# Patient Record
Sex: FEMALE | Race: WHITE | NOT HISPANIC OR LATINO | Employment: FULL TIME | URBAN - METROPOLITAN AREA
[De-identification: names, ages, dates, MRNs, and addresses within clinical notes are randomized per-mention and may not be internally consistent; named-entity substitution may affect disease eponyms.]

---

## 2021-05-18 ENCOUNTER — TELEPHONE (OUTPATIENT)
Dept: SCHEDULING | Facility: IMAGING CENTER | Age: 61
End: 2021-05-18

## 2021-05-19 ENCOUNTER — OFFICE VISIT (OUTPATIENT)
Dept: MEDICAL GROUP | Facility: PHYSICIAN GROUP | Age: 61
End: 2021-05-19
Payer: COMMERCIAL

## 2021-05-19 VITALS
BODY MASS INDEX: 36.64 KG/M2 | OXYGEN SATURATION: 94 % | TEMPERATURE: 96.9 F | HEART RATE: 68 BPM | RESPIRATION RATE: 12 BRPM | DIASTOLIC BLOOD PRESSURE: 80 MMHG | SYSTOLIC BLOOD PRESSURE: 130 MMHG | WEIGHT: 228 LBS | HEIGHT: 66 IN

## 2021-05-19 DIAGNOSIS — R53.83 OTHER FATIGUE: ICD-10-CM

## 2021-05-19 DIAGNOSIS — R01.2 ABNORMAL HEART SOUNDS: ICD-10-CM

## 2021-05-19 DIAGNOSIS — E78.5 HYPERLIPIDEMIA, UNSPECIFIED HYPERLIPIDEMIA TYPE: ICD-10-CM

## 2021-05-19 DIAGNOSIS — K21.9 GASTROESOPHAGEAL REFLUX DISEASE WITHOUT ESOPHAGITIS: ICD-10-CM

## 2021-05-19 DIAGNOSIS — Z98.890 STATUS POST DILATION OF ESOPHAGEAL NARROWING: ICD-10-CM

## 2021-05-19 DIAGNOSIS — R63.5 WEIGHT GAIN: ICD-10-CM

## 2021-05-19 DIAGNOSIS — Z87.19 STATUS POST DILATION OF ESOPHAGEAL NARROWING: ICD-10-CM

## 2021-05-19 DIAGNOSIS — R03.0 ELEVATED BLOOD PRESSURE READING: ICD-10-CM

## 2021-05-19 PROCEDURE — 99203 OFFICE O/P NEW LOW 30 MIN: CPT | Performed by: NURSE PRACTITIONER

## 2021-05-19 RX ORDER — M-VIT,TX,IRON,MINS/CALC/FOLIC 27MG-0.4MG
1 TABLET ORAL DAILY
COMMUNITY

## 2021-05-19 ASSESSMENT — ENCOUNTER SYMPTOMS
WHEEZING: 0
SHORTNESS OF BREATH: 0
DIZZINESS: 0
WEIGHT LOSS: 0
COUGH: 0
DEPRESSION: 0
ABDOMINAL PAIN: 0
BLOOD IN STOOL: 0
BLURRED VISION: 0
HEADACHES: 0
INSOMNIA: 0
MUSCULOSKELETAL NEGATIVE: 1
HEARTBURN: 1
CHILLS: 0
NERVOUS/ANXIOUS: 0
PALPITATIONS: 0
WEAKNESS: 0
FEVER: 0

## 2021-05-19 NOTE — ASSESSMENT & PLAN NOTE
History of GERD.  Patient was previously on omeprazole for 2 weeks after she had a chest like pressure event and a cardiac work-up was negative in [place].  At that time she had also had a EGD completed with dilation in 2017.

## 2021-05-19 NOTE — ASSESSMENT & PLAN NOTE
Acute uncomplicated condition.  Patient currently with a blood pressure of 130/80.  States that occasionally at home she is noticed that her blood pressure has been increasing.  Her blood pressure cuff is packed away as they just moved here from [place].  Denies any headaches, change in vision, shortness of breath, chest pressure.

## 2021-05-19 NOTE — ASSESSMENT & PLAN NOTE
Chronic and stable condition.  States an increase in weight gain over the last few months.  She has been stressed with moving  Has not had a lot of time to get out and exercise at this time but does hike and walk around the neighborhood with her

## 2021-05-19 NOTE — ASSESSMENT & PLAN NOTE
Chronic and stable condition.  History of esophageal dilation back in 2017 when she was having difficulty with swallowing.  At that time she was also taking omeprazole for GERD.  Currently states that she still has occasional difficulty swallowing about 2-3 times a week.

## 2021-05-19 NOTE — PROGRESS NOTES
Chief Complaint   Patient presents with   • Establish Care      Subjective:     Katherine Davis is a 61 y.o. female who recently moved from [place] to [place] to be closer to family.  Presenting to establish care and management of:      Elevated blood pressure reading  Acute uncomplicated condition.  Patient currently with a blood pressure of 130/80.  States that occasionally at home she is noticed that her blood pressure has been increasing.  Her blood pressure cuff is packed away as they just moved here from [place].  Denies any headaches, change in vision, shortness of breath, chest pressure.    Weight gain  Chronic and stable condition.  States an increase in weight gain over the last few months.  She has been stressed with moving  Has not had a lot of time to get out and exercise at this time but does hike and walk around the neighborhood with her     Hyperlipidemia  Familial history.  We will obtain new labs.    Other fatigue  Acute uncomplicated condition.  Patient is uncertain if this could be due to her recently moving and trying to move back into their new house.    Gastroesophageal reflux disease without esophagitis  History of GERD.  Patient was previously on omeprazole for 2 weeks after she had a chest like pressure event and a cardiac work-up was negative in [place].  At that time she had also had a EGD completed with dilation in 2017.    Status post dilation of esophageal narrowing  Chronic and stable condition.  History of esophageal dilation back in 2017 when she was having difficulty with swallowing.  At that time she was also taking omeprazole for GERD.  Currently states that she still has occasional difficulty swallowing about 2-3 times a week.    Abnormal heart sounds  possible mitral regurigation  Patient denies any history  Will obtain records [facility]  Denies SOB, syncopal episodes or LE edema       Review of Systems   Constitutional: Negative for chills, fever and  "weight loss.   HENT: Negative.  Negative for congestion and hearing loss.    Eyes: Negative for blurred vision.   Respiratory: Negative for cough, shortness of breath and wheezing.    Cardiovascular: Negative for chest pain, palpitations and leg swelling.   Gastrointestinal: Positive for heartburn. Negative for abdominal pain and blood in stool.   Genitourinary: Negative for dysuria and hematuria.   Musculoskeletal: Negative.    Skin: Negative.    Neurological: Negative for dizziness, weakness and headaches.   Psychiatric/Behavioral: Negative for depression and suicidal ideas. The patient is not nervous/anxious and does not have insomnia.             Current Outpatient Medications:   •  therapeutic multivitamin-minerals (THERAGRAN-M) Tab, Take 1 tablet by mouth every day., Disp: , Rfl:   •  Turmeric (QC TUMERIC COMPLEX PO), Take  by mouth., Disp: , Rfl:     No past medical history on file.    No past surgical history on file.    Family History   Problem Relation Age of Onset   • Hyperlipidemia Father    • Lung Disease Brother         asthma       Patient has no known allergies.    Allergies, past medical history, past surgical history, family history, social history reviewed and updated    Objective:     Vitals: /80   Pulse 68   Temp 36.1 °C (96.9 °F) (Temporal)   Resp 12   Ht 1.676 m (5' 6\")   Wt 103 kg (228 lb)   SpO2 94%   BMI 36.80 kg/m²   General: Alert, pleasant, NAD  EYES:   PERRL, EOMI, no icterus or pallor.  Conjunctivae and lids normal.   HENT:  Normocephalic.  External ears normal. Tympanic membranes pearly, opaque.  No nasal drainage present.  Oropharynx non-erythematous, mucous membranes moist.  Neck supple.   No thyromegaly or masses palpated. No cervical or supraclavicular lymphadenopathy.  Heart: Regular rate and rhythm.  Murmur appreciated.  Respiratory: Normal respiratory effort.  Clear to auscultation bilaterally.  Abdomen: Non-distended, soft, non-tender, no guarding/rebound. Bowel " sounds present.  No hepatosplenomegaly.  No hernias.  Skin: Warm, dry, no rashes.  Musculoskeletal: Gait is normal.  Moves all extremities well.    Extremities: No clubbing, cyanosis or edema noted.   Neurological: No tremors, sensation grossly intact, tone/strength normal, gait is normal, CN2-12 intact.  Psych:  Affect/mood is normal, judgement is good, memory is intact, grooming is appropriate.    Assessment/Plan:     1. Elevated blood pressure reading  - Comp Metabolic Panel; Future  Patient will bring in BP journal for further evaluation     2. Weight gain  - TSH WITH REFLEX TO FT4; Future  Life style management, diet and exercise.     3. Hyperlipidemia, unspecified hyperlipidemia type  - Lipid Profile; Future    4. Other fatigue  - CBC WITH DIFFERENTIAL; Future  - Comp Metabolic Panel; Future  - TSH WITH REFLEX TO FT4; Future  We will get labs to r/o any hormonal issues    5. Gastroesophageal reflux disease without esophagitis  Was previously on Omeprazole. Patient is willing to try OTC for the next two weeks to see if there are any changes and will follow up on efficacy  In 2 weeks    6. Status post dilation of esophageal narrowing  Will obtain results and efficacy of medication.   Possible referral for GI at next visit if patient is still experiencing issues.     Discussed with patient possible alternative diagnoses, patient is to take all medications as prescribed.     If symptoms persist FU w/PCP, if symptoms worsen go to emergency room.     If experiencing any side effects from prescribed medications reports to the office immediately or go to emergency room.    Reviewed indication, dosage, usage and potential adverse effects of prescribed medications.     Reviewed risks and benefits of treatment plan. Patient verbalizes understanding of all instruction and verbally agrees to plan.    Discussed plan with the patient, and she agrees to the above.      I personally reviewed prior external notes and test  results pertinent to today's visit.        Return in about 2 weeks (around 6/2/2021).    My total time spent caring for the patient on the day of the encounter was 30 minutes.   This does not include time spent on separately billable procedures/tests.     Please note that this dictation was created using voice recognition software. I have made every reasonable attempt to correct obvious errors, but I expect that there may be errors of grammar and possibly content that I did not discover before finalizing the note.

## 2021-05-19 NOTE — ASSESSMENT & PLAN NOTE
possible mitral regurigation  Patient denies any history  Will obtain records from [facility]  Denies SOB, syncopal episodes or LE edema

## 2021-05-19 NOTE — ASSESSMENT & PLAN NOTE
Acute uncomplicated condition.  Patient is uncertain if this could be due to her recently moving and trying to move back into their new house.

## 2021-05-28 ENCOUNTER — HOSPITAL ENCOUNTER (OUTPATIENT)
Dept: LAB | Facility: MEDICAL CENTER | Age: 61
End: 2021-05-28
Attending: NURSE PRACTITIONER
Payer: COMMERCIAL

## 2021-05-28 DIAGNOSIS — R53.83 OTHER FATIGUE: ICD-10-CM

## 2021-05-28 DIAGNOSIS — R03.0 ELEVATED BLOOD PRESSURE READING: ICD-10-CM

## 2021-05-28 DIAGNOSIS — R63.5 WEIGHT GAIN: ICD-10-CM

## 2021-05-28 DIAGNOSIS — E78.5 HYPERLIPIDEMIA, UNSPECIFIED HYPERLIPIDEMIA TYPE: ICD-10-CM

## 2021-05-28 LAB
BASOPHILS # BLD AUTO: 1 % (ref 0–1.8)
BASOPHILS # BLD: 0.08 K/UL (ref 0–0.12)
EOSINOPHIL # BLD AUTO: 0.15 K/UL (ref 0–0.51)
EOSINOPHIL NFR BLD: 2 % (ref 0–6.9)
ERYTHROCYTE [DISTWIDTH] IN BLOOD BY AUTOMATED COUNT: 50.7 FL (ref 35.9–50)
HCT VFR BLD AUTO: 45 % (ref 37–47)
HGB BLD-MCNC: 14.4 G/DL (ref 12–16)
IMM GRANULOCYTES # BLD AUTO: 0.02 K/UL (ref 0–0.11)
IMM GRANULOCYTES NFR BLD AUTO: 0.3 % (ref 0–0.9)
LYMPHOCYTES # BLD AUTO: 1.93 K/UL (ref 1–4.8)
LYMPHOCYTES NFR BLD: 25.2 % (ref 22–41)
MCH RBC QN AUTO: 32.3 PG (ref 27–33)
MCHC RBC AUTO-ENTMCNC: 32 G/DL (ref 33.6–35)
MCV RBC AUTO: 100.9 FL (ref 81.4–97.8)
MONOCYTES # BLD AUTO: 0.68 K/UL (ref 0–0.85)
MONOCYTES NFR BLD AUTO: 8.9 % (ref 0–13.4)
NEUTROPHILS # BLD AUTO: 4.8 K/UL (ref 2–7.15)
NEUTROPHILS NFR BLD: 62.6 % (ref 44–72)
NRBC # BLD AUTO: 0 K/UL
NRBC BLD-RTO: 0 /100 WBC
PLATELET # BLD AUTO: 318 K/UL (ref 164–446)
PMV BLD AUTO: 9.8 FL (ref 9–12.9)
RBC # BLD AUTO: 4.46 M/UL (ref 4.2–5.4)
WBC # BLD AUTO: 7.7 K/UL (ref 4.8–10.8)

## 2021-05-28 PROCEDURE — 85025 COMPLETE CBC W/AUTO DIFF WBC: CPT

## 2021-05-28 PROCEDURE — 36415 COLL VENOUS BLD VENIPUNCTURE: CPT

## 2021-05-28 PROCEDURE — 80061 LIPID PANEL: CPT

## 2021-05-28 PROCEDURE — 80053 COMPREHEN METABOLIC PANEL: CPT

## 2021-05-28 PROCEDURE — 84443 ASSAY THYROID STIM HORMONE: CPT

## 2021-05-29 LAB
ALBUMIN SERPL BCP-MCNC: 4.3 G/DL (ref 3.2–4.9)
ALBUMIN/GLOB SERPL: 1.4 G/DL
ALP SERPL-CCNC: 113 U/L (ref 30–99)
ALT SERPL-CCNC: 17 U/L (ref 2–50)
ANION GAP SERPL CALC-SCNC: 11 MMOL/L (ref 7–16)
AST SERPL-CCNC: 20 U/L (ref 12–45)
BILIRUB SERPL-MCNC: 0.4 MG/DL (ref 0.1–1.5)
BUN SERPL-MCNC: 18 MG/DL (ref 8–22)
CALCIUM SERPL-MCNC: 9.7 MG/DL (ref 8.5–10.5)
CHLORIDE SERPL-SCNC: 102 MMOL/L (ref 96–112)
CHOLEST SERPL-MCNC: 183 MG/DL (ref 100–199)
CO2 SERPL-SCNC: 26 MMOL/L (ref 20–33)
CREAT SERPL-MCNC: 0.85 MG/DL (ref 0.5–1.4)
FASTING STATUS PATIENT QL REPORTED: NORMAL
GLOBULIN SER CALC-MCNC: 3 G/DL (ref 1.9–3.5)
GLUCOSE SERPL-MCNC: 82 MG/DL (ref 65–99)
HDLC SERPL-MCNC: 65 MG/DL
LDLC SERPL CALC-MCNC: 108 MG/DL
POTASSIUM SERPL-SCNC: 4.6 MMOL/L (ref 3.6–5.5)
PROT SERPL-MCNC: 7.3 G/DL (ref 6–8.2)
SODIUM SERPL-SCNC: 139 MMOL/L (ref 135–145)
TRIGL SERPL-MCNC: 50 MG/DL (ref 0–149)
TSH SERPL DL<=0.005 MIU/L-ACNC: 3.31 UIU/ML (ref 0.38–5.33)

## 2021-06-03 ENCOUNTER — OFFICE VISIT (OUTPATIENT)
Dept: MEDICAL GROUP | Facility: PHYSICIAN GROUP | Age: 61
End: 2021-06-03
Payer: COMMERCIAL

## 2021-06-03 VITALS
BODY MASS INDEX: 36.42 KG/M2 | TEMPERATURE: 98.4 F | HEART RATE: 62 BPM | DIASTOLIC BLOOD PRESSURE: 78 MMHG | OXYGEN SATURATION: 98 % | WEIGHT: 226.6 LBS | SYSTOLIC BLOOD PRESSURE: 122 MMHG | HEIGHT: 66 IN | RESPIRATION RATE: 16 BRPM

## 2021-06-03 DIAGNOSIS — R71.8 ELEVATED MCV: ICD-10-CM

## 2021-06-03 DIAGNOSIS — E78.5 HYPERLIPIDEMIA, UNSPECIFIED HYPERLIPIDEMIA TYPE: ICD-10-CM

## 2021-06-03 DIAGNOSIS — R74.8 ELEVATED ALKALINE PHOSPHATASE LEVEL: ICD-10-CM

## 2021-06-03 DIAGNOSIS — Z12.31 ENCOUNTER FOR SCREENING MAMMOGRAM FOR BREAST CANCER: ICD-10-CM

## 2021-06-03 DIAGNOSIS — R03.0 ELEVATED BLOOD PRESSURE READING: ICD-10-CM

## 2021-06-03 PROCEDURE — 99213 OFFICE O/P EST LOW 20 MIN: CPT | Performed by: NURSE PRACTITIONER

## 2021-06-03 ASSESSMENT — ENCOUNTER SYMPTOMS
FEVER: 0
DEPRESSION: 0
WEIGHT LOSS: 1
BLOOD IN STOOL: 0
PALPITATIONS: 0
WHEEZING: 0
SHORTNESS OF BREATH: 0
CHILLS: 0
WEAKNESS: 0
DIZZINESS: 0
HEADACHES: 0
COUGH: 0

## 2021-06-03 ASSESSMENT — PATIENT HEALTH QUESTIONNAIRE - PHQ9: CLINICAL INTERPRETATION OF PHQ2 SCORE: 0

## 2021-06-03 ASSESSMENT — FIBROSIS 4 INDEX: FIB4 SCORE: 0.95

## 2021-06-03 NOTE — LETTER
[facility]  BRODY Quinn  [address]  [address]  [facility] [address]  Fax: [phone]   Authorization for Release/Disclosure of   Protected Health Information   Name: KATHERINE IVEY : 1960 SSN: [id number]   Address: [address] [place] [address]   [facility] [address] Phone:    [phone] (home)    I authorize the entity listed below to release/disclose the PHI below to:   [facility]/BRODY Quinn and BRODY Quinn   Provider or Entity Name:     Address   City, State, Zip   Phone:      Fax:     Reason for request: continuity of care   Information to be released:    [  ] LAST COLONOSCOPY,  including any PATH REPORT and follow-up  [  ] LAST FIT/COLOGUARD RESULT [  ] LAST DEXA  [  ] LAST MAMMOGRAM  [  ] LAST PAP  [  ] LAST LABS [  ] RETINA EXAM REPORT  [  ] IMMUNIZATION RECORDS  [  ] Release all info      [  ] Check here and initial the line next to each item to release ALL health information INCLUDING  _____ Care and treatment for drug and / or alcohol abuse  _____ HIV testing, infection status, or AIDS  _____ Genetic Testing    DATES OF SERVICE OR TIME PERIOD TO BE DISCLOSED: _____________  I understand and acknowledge that:  * This Authorization may be revoked at any time by you in writing, except if your health information has already been used or disclosed.  * Your health information that will be used or disclosed as a result of you signing this authorization could be re-disclosed by the recipient. If this occurs, your re-disclosed health information may no longer be protected by State or Federal laws.  * You may refuse to sign this Authorization. Your refusal will not affect your ability to obtain treatment.  * This Authorization becomes effective upon signing and will  on (date) __________.      If no date is indicated, this Authorization will  one (1) year from the signature date.    Name: Katherine Ivey    Signature:   Date:     6/3/2021       PLEASE  FAX REQUESTED RECORDS BACK TO: [phone]

## 2021-06-03 NOTE — PROGRESS NOTES
CC:  Chief Complaint   Patient presents with   • Lab Results       HISTORY OF PRESENT ILLNESS: Patient is a 61 y.o. female established patient presenting follow up labs      Elevated blood pressure reading  Chronic and stable  Has wrist BP  115-120   Today 122/78 in the office  Denies HA, lightheadedness, CP or change in vision.     Hyperlipidemia  Results for GIO IVEY (MRN [id number]) as of 6/3/2021 08:35   Ref. Range 5/28/2021 11:22   Cholesterol,Tot Latest Ref Range: 100 - 199 mg/dL 183   Triglycerides Latest Ref Range: 0 - 149 mg/dL 50   HDL Latest Ref Range: >=40 mg/dL 65   LDL Latest Ref Range: <100 mg/dL 108 (H)       Status post dilation of esophageal narrowing  States seems to be improving  Denies current dysphagia    Gastroesophageal reflux disease without esophagitis  Not currently taking anything  Cut back on her teas      Weight gain  Chronic and stable condition   Walking more now with dog and  at dusk  Lost 2 pounds since last visit  Diet- eating better  Was horrible due to traveling and moving for the last year. Was eating a lot of fast foods but now with being settled down they are eating healthier    Other fatigue  Seems to be improving with settling down not after moving    Abnormal heart sounds  Pending records from [place]- request sent again  Denies syncope or SOB, CP      Patient Active Problem List    Diagnosis Date Noted   • Elevated blood pressure reading 05/19/2021   • Weight gain 05/19/2021   • Hyperlipidemia 05/19/2021   • Other fatigue 05/19/2021   • Gastroesophageal reflux disease without esophagitis 05/19/2021   • Status post dilation of esophageal narrowing 05/19/2021   • Abnormal heart sounds 05/19/2021      Allergies:Patient has no known allergies.    Current Outpatient Medications   Medication Sig Dispense Refill   • therapeutic multivitamin-minerals (THERAGRAN-M) Tab Take 1 tablet by mouth every day.     • Turmeric (QC TUMERIC COMPLEX PO) Take  by mouth.       No current  "facility-administered medications for this visit.       Social History     Tobacco Use   • Smoking status: Never Smoker   • Smokeless tobacco: Never Used   Vaping Use   • Vaping Use: Never used   Substance Use Topics   • Alcohol use: Yes     Comment: occasional   • Drug use: Never     Social History     Social History Narrative   • Not on file       Family History   Problem Relation Age of Onset   • Hyperlipidemia Father    • Lung Disease Brother         asthma        Review of Systems   Constitutional: Positive for weight loss. Negative for chills and fever.        Intentional   Respiratory: Negative for cough, shortness of breath and wheezing.    Cardiovascular: Negative for chest pain, palpitations and leg swelling.   Gastrointestinal: Negative for blood in stool.   Genitourinary: Negative for hematuria.   Neurological: Negative for dizziness, weakness and headaches.   Psychiatric/Behavioral: Negative for depression and suicidal ideas.             - NOTE: All other systems reviewed and are negative, except as in HPI.      Exam:    /78   Pulse 62   Temp 36.9 °C (98.4 °F) (Temporal)   Resp 16   Ht 1.676 m (5' 6\")   Wt 103 kg (226 lb 9.6 oz)   SpO2 98%  Body mass index is 36.57 kg/m².    General: Alert, pleasant, NAD  EYES:   PERRL, EOMI, no icterus or pallor.  Conjunctivae and lids normal.   HENT:  Normocephalic.  External ears normal.   No nasal drainage present.  Oropharynx non-erythematous, mucous membranes moist.  Neck supple.   No thyromegaly or masses palpated. No cervical or supraclavicular lymphadenopathy.  Heart: Regular rate and rhythm.  S1 and S2 normal.  Respiratory: Normal respiratory effort.  Clear to auscultation bilaterally.  Abdomen: Non-distended, soft, non-tender, no guarding/rebound.   Skin: Warm, dry, no rashes.  Musculoskeletal: Gait is normal.  Moves all extremities well.    Extremities: No clubbing, cyanosis or edema noted.   Neurological: No tremors, sensation grossly intact, " tone/strength normal, gait is normal, CN2-12 intact.  Psych:  Affect/mood is normal, judgement is good, memory is intact, grooming is appropriate.      LABS: 5/28/2021: Results reviewed and discussed with the patient, questions answered.    Assessment/Plan:  1. Elevated blood pressure reading  Controlled and stable  Will continue to monitor    2. Hyperlipidemia, unspecified hyperlipidemia type  Lifestyle modifications- diet and exercise  Discussed walking with 2-3 word sentences at a comfortable pace 150 minutes a week    3. Elevated alkaline phosphatase level  - ALKALINE PHOSPHATASE; Future  Will trend in 1 month    4. Elevated MCV  - CBC WITHOUT DIFFERENTIAL; Future  Will trend in 1 month    5. Encounter for screening mammogram for breast cancer  - MA-SCREENING MAMMO BILAT W/TOMOSYNTHESIS W/CAD; Future  Pending records from [place] as well       Discussed with patient possible alternative diagnoses, patient is to take all medications as prescribed.     If symptoms persist FU w/PCP, if symptoms worsen go to emergency room.     If experiencing any side effects from prescribed medications reports to the office immediately or go to emergency room.    Reviewed indication, dosage, usage and potential adverse effects of prescribed medications.     Reviewed risks and benefits of treatment plan. Patient verbalizes understanding of all instruction and verbally agrees to plan.      Return in about 4 weeks (around 7/1/2021) for PAP.    My total time spent caring for the patient on the day of the encounter was 22 minutes.   This does not include time spent on separately billable procedures/tests.     Please note that this dictation was created using voice recognition software. I have made every reasonable attempt to correct obvious errors, but I expect that there are errors of grammar and possibly content that I did not discover before finalizing the note.

## 2021-06-03 NOTE — ASSESSMENT & PLAN NOTE
Chronic and stable condition   Walking more now with dog and  at dusk  Lost 2 pounds since last visit  Diet- eating better  Was horrible due to traveling and moving for the last year. Was eating a lot of fast foods but now with being settled down they are eating healthier

## 2021-06-03 NOTE — ASSESSMENT & PLAN NOTE
Chronic and stable  Has wrist BP  115-120   Today 122/78 in the office  Denies HA, lightheadedness, CP or change in vision.

## 2021-06-03 NOTE — ASSESSMENT & PLAN NOTE
Results for GIO IVEY (MRN [id number]) as of 6/3/2021 08:35   Ref. Range 5/28/2021 11:22   Cholesterol,Tot Latest Ref Range: 100 - 199 mg/dL 183   Triglycerides Latest Ref Range: 0 - 149 mg/dL 50   HDL Latest Ref Range: >=40 mg/dL 65   LDL Latest Ref Range: <100 mg/dL 108 (H)

## 2021-07-02 ENCOUNTER — APPOINTMENT (OUTPATIENT)
Dept: MEDICAL GROUP | Facility: PHYSICIAN GROUP | Age: 61
End: 2021-07-02
Payer: COMMERCIAL

## 2022-10-21 ENCOUNTER — TELEPHONE (OUTPATIENT)
Dept: HEALTH INFORMATION MANAGEMENT | Facility: OTHER | Age: 62
End: 2022-10-21